# Patient Record
Sex: MALE | Race: BLACK OR AFRICAN AMERICAN | NOT HISPANIC OR LATINO | Employment: FULL TIME | URBAN - METROPOLITAN AREA
[De-identification: names, ages, dates, MRNs, and addresses within clinical notes are randomized per-mention and may not be internally consistent; named-entity substitution may affect disease eponyms.]

---

## 2018-03-01 ENCOUNTER — OFFICE VISIT (OUTPATIENT)
Dept: URGENT CARE | Facility: PHYSICIAN GROUP | Age: 58
End: 2018-03-01
Payer: COMMERCIAL

## 2018-03-01 VITALS
HEART RATE: 84 BPM | RESPIRATION RATE: 16 BRPM | OXYGEN SATURATION: 98 % | TEMPERATURE: 100 F | WEIGHT: 187 LBS | SYSTOLIC BLOOD PRESSURE: 104 MMHG | DIASTOLIC BLOOD PRESSURE: 64 MMHG | HEIGHT: 70 IN | BODY MASS INDEX: 26.77 KG/M2

## 2018-03-01 DIAGNOSIS — R68.89 FLU-LIKE SYMPTOMS: ICD-10-CM

## 2018-03-01 DIAGNOSIS — J02.9 PHARYNGITIS, UNSPECIFIED ETIOLOGY: ICD-10-CM

## 2018-03-01 LAB
INT CON NEG: NEGATIVE
INT CON POS: POSITIVE
S PYO AG THROAT QL: NORMAL

## 2018-03-01 PROCEDURE — 99203 OFFICE O/P NEW LOW 30 MIN: CPT | Performed by: PHYSICIAN ASSISTANT

## 2018-03-01 PROCEDURE — 87880 STREP A ASSAY W/OPTIC: CPT | Performed by: PHYSICIAN ASSISTANT

## 2018-03-01 ASSESSMENT — ENCOUNTER SYMPTOMS
HEADACHES: 1
SORE THROAT: 1
LOSS OF CONSCIOUSNESS: 1
SINUS PAIN: 1
DIZZINESS: 1
PSYCHIATRIC NEGATIVE: 1
GASTROINTESTINAL NEGATIVE: 1
CARDIOVASCULAR NEGATIVE: 1
COUGH: 1
EYES NEGATIVE: 1
MYALGIAS: 1
FEVER: 1

## 2018-03-01 NOTE — PATIENT INSTRUCTIONS
Flonase and nasal saline irrigation (netti pot or Tim Med Sinus Rinse).  Used distilled water or boiled tap water with nasal flushes, not straight tap water.  Humidifier at bedtime.  Hot steam showers to loosen up mucous.  Cough medicine at bedtime (nyquil).  Delsym during the day.  Lots of fluids, tea with honey.  Ibuprofen for headache, fever, chills.  Be sure to take with food.  Salt water gargles.  Return if worsening: Yellow thicker mucus changes, worsening pain around the eyes and radiates to the teeth, and fever over 101°F.

## 2018-03-01 NOTE — PROGRESS NOTES
Subjective:      Víctor Peacock is a 57 y.o. male who presents with Pharyngitis (x2-3 days,loss of voice, passed out this AM and yesterday)            HPI  Chief Complaint   Patient presents with   • Pharyngitis     x2-3 days,loss of voice, passed out this AM and yesterday       HPI:  Víctor Peacock is a 57 y.o. maled who presents with sore throat and extreme fatigue for 2-3 days. Started actually on Monday with body aches, sore throat, loss of voice, and dizziness. Feeling quite fatigued. States he passed out last night and again this morning while sitting on the stairs. He denies any prodrome or history of seizures. Just suddenly faded out. Does remember feeling hot and cold prior to incident. Has not been drinking enough water or eating. Lack of appetite. Wife and room with similar symptoms that started for 5 days prior. He has not tried any cough expectorants. Minimal cough. Has Chloraseptic with little improvement. Symptoms started on Tuesday.  Patient denies, chest pain, palpitations,or n/v/d.      History reviewed. No pertinent past medical history.    History reviewed. No pertinent surgical history.    History reviewed. No pertinent family history.  No pertinent family history.    Social History     Social History   • Marital status:      Spouse name: N/A   • Number of children: N/A   • Years of education: N/A     Occupational History   • Not on file.     Social History Main Topics   • Smoking status: Never Smoker   • Smokeless tobacco: Never Used   • Alcohol use No   • Drug use: No   • Sexual activity: Yes     Partners: Female      Comment:      Other Topics Concern   • Not on file     Social History Narrative   • No narrative on file       No current outpatient prescriptions on file.    No Known Allergies     Review of Systems   Constitutional: Positive for fever and malaise/fatigue.   HENT: Positive for sinus pain and sore throat.    Eyes: Negative.    Respiratory: Positive for cough.   "  Cardiovascular: Negative.    Gastrointestinal: Negative.    Genitourinary: Negative.    Musculoskeletal: Positive for myalgias.   Skin: Negative.    Neurological: Positive for dizziness, loss of consciousness and headaches.   Endo/Heme/Allergies: Negative.    Psychiatric/Behavioral: Negative.           Objective:     /64   Pulse 84   Temp 37.8 °C (100 °F)   Resp 16   Ht 1.778 m (5' 10\")   Wt 84.8 kg (187 lb)   SpO2 98%   BMI 26.83 kg/m²      Physical Exam       Nursing note and vital signs reviewed.    Constitutional:  Appropriately groomed, pleasant affect, well nourished, and in no acute distress.    HEENT:  Head: Atruamatic, normocephalic.    Ears:  EAC with mild cerumen bilaterally, not erythematous.  TM’s pearly gray with cone of light present and umbo and malleolus visible bilaterally.  No bulging or fluid bubbles present in middle ear.    Eyes:  PERRLA, EOM's full, sclera white, conjunctiva not erythematous, and medial canthus without exudate bilaterally.    Nose:  Nares patent bilaterally.  Nasal mucosa edematous with clear rhinorrhea bilaterally.  Frontal and maxillary sinuses not tender to percussion.    Throat:  Oropharynx erythematous, with no enlargement of the palatine tonsils bilaterally with no exudates.    Posterior oropharynx with cobblestoning and clear to white post nasal drainage present.  Soft palate rises symmetrically bilaterally and uvula midline.  Neck supple, with mild proximal anterior cervical chain lymphadenopathy that is soft and mobile to palpation.      Lungs: Respiratory effort within normal limits. Lungs clear to auscultation bilaterally. No crackles or rhonchi noted.     Heart:  Regular rate and rhythm without rubs, murmurs, or gallops. Dorsalis pedis and radial pulses 2+ bilaterally. No lower extremity edema.    Muscle skeletal:  Gait and station wnl, non antalgic.    Derm:  No lesions or rashes. Overall good turgor pressure.     Psychiatric:  Normal judgement, " mood and affect.       Assessment/Plan:     1. Flu-like symptoms  POCT Rapid Strep A   2. Pharyngitis, unspecified etiology  POCT Rapid Strep A      Patient presents with flulike symptoms outside of testing or treatment window. Worsening sore throat. On exam patient has erythematous oropharynx but not significantly enlarged tonsils bilaterally. Rapid strep negative. Lungs clear to auscultation. Patient with temperature of 100° with a pulse ox of 98% room air. Suspect influenza in a vice pushing fluids and continue ibuprofen and Tylenol. Patient's episodes of loss of consciousness likely due to poor by mouth intake, fluids and rapid fever and chills. Did advise ER if symptoms worsen. Advised patient to take Phenergan with deslym for bedtime that I prescribed his wife and monitor for changes. Advised humidifier and nasal saline irrigation.    Patient was in agreement with this treatment plan and seemed to understand without barriers. All questions were encouraged and answered.  Reviewed signs and symptoms of when to seek emergency medical care.     Please note that this dictation was created using voice recognition software.  I have made every reasonable attempt to correct obvious errors, but I expect there are errors of eloise and possibly content that I did not discover before finalizing the note.

## 2021-03-23 ENCOUNTER — IMMUNIZATION (OUTPATIENT)
Dept: FAMILY PLANNING/WOMEN'S HEALTH CLINIC | Facility: IMMUNIZATION CENTER | Age: 61
End: 2021-03-23
Payer: COMMERCIAL

## 2021-03-23 DIAGNOSIS — Z23 ENCOUNTER FOR VACCINATION: Primary | ICD-10-CM

## 2021-03-23 PROCEDURE — 0001A PFIZER SARS-COV-2 VACCINE: CPT

## 2021-03-23 PROCEDURE — 91300 PFIZER SARS-COV-2 VACCINE: CPT

## 2021-04-16 ENCOUNTER — IMMUNIZATION (OUTPATIENT)
Dept: FAMILY PLANNING/WOMEN'S HEALTH CLINIC | Facility: IMMUNIZATION CENTER | Age: 61
End: 2021-04-16
Attending: INTERNAL MEDICINE
Payer: COMMERCIAL

## 2021-04-16 DIAGNOSIS — Z23 ENCOUNTER FOR VACCINATION: Primary | ICD-10-CM

## 2021-04-16 PROCEDURE — 0002A PFIZER SARS-COV-2 VACCINE: CPT

## 2021-04-16 PROCEDURE — 91300 PFIZER SARS-COV-2 VACCINE: CPT

## 2022-07-13 ENCOUNTER — TELEPHONE (OUTPATIENT)
Dept: SCHEDULING | Facility: IMAGING CENTER | Age: 62
End: 2022-07-13

## 2023-07-11 SDOH — ECONOMIC STABILITY: HOUSING INSECURITY
IN THE LAST 12 MONTHS, WAS THERE A TIME WHEN YOU DID NOT HAVE A STEADY PLACE TO SLEEP OR SLEPT IN A SHELTER (INCLUDING NOW)?: NO

## 2023-07-11 SDOH — ECONOMIC STABILITY: TRANSPORTATION INSECURITY
IN THE PAST 12 MONTHS, HAS LACK OF TRANSPORTATION KEPT YOU FROM MEETINGS, WORK, OR FROM GETTING THINGS NEEDED FOR DAILY LIVING?: PATIENT DECLINED

## 2023-07-11 SDOH — ECONOMIC STABILITY: TRANSPORTATION INSECURITY
IN THE PAST 12 MONTHS, HAS THE LACK OF TRANSPORTATION KEPT YOU FROM MEDICAL APPOINTMENTS OR FROM GETTING MEDICATIONS?: PATIENT DECLINED

## 2023-07-11 SDOH — ECONOMIC STABILITY: FOOD INSECURITY: WITHIN THE PAST 12 MONTHS, YOU WORRIED THAT YOUR FOOD WOULD RUN OUT BEFORE YOU GOT MONEY TO BUY MORE.: NEVER TRUE

## 2023-07-11 SDOH — HEALTH STABILITY: PHYSICAL HEALTH: ON AVERAGE, HOW MANY DAYS PER WEEK DO YOU ENGAGE IN MODERATE TO STRENUOUS EXERCISE (LIKE A BRISK WALK)?: 3 DAYS

## 2023-07-11 SDOH — ECONOMIC STABILITY: HOUSING INSECURITY: IN THE LAST 12 MONTHS, HOW MANY PLACES HAVE YOU LIVED?: 1

## 2023-07-11 SDOH — ECONOMIC STABILITY: TRANSPORTATION INSECURITY
IN THE PAST 12 MONTHS, HAS LACK OF RELIABLE TRANSPORTATION KEPT YOU FROM MEDICAL APPOINTMENTS, MEETINGS, WORK OR FROM GETTING THINGS NEEDED FOR DAILY LIVING?: PATIENT DECLINED

## 2023-07-11 SDOH — ECONOMIC STABILITY: FOOD INSECURITY: WITHIN THE PAST 12 MONTHS, THE FOOD YOU BOUGHT JUST DIDN'T LAST AND YOU DIDN'T HAVE MONEY TO GET MORE.: NEVER TRUE

## 2023-07-11 SDOH — ECONOMIC STABILITY: INCOME INSECURITY: IN THE LAST 12 MONTHS, WAS THERE A TIME WHEN YOU WERE NOT ABLE TO PAY THE MORTGAGE OR RENT ON TIME?: NO

## 2023-07-11 SDOH — HEALTH STABILITY: PHYSICAL HEALTH: ON AVERAGE, HOW MANY MINUTES DO YOU ENGAGE IN EXERCISE AT THIS LEVEL?: 40 MIN

## 2023-07-11 SDOH — HEALTH STABILITY: MENTAL HEALTH
STRESS IS WHEN SOMEONE FEELS TENSE, NERVOUS, ANXIOUS, OR CAN'T SLEEP AT NIGHT BECAUSE THEIR MIND IS TROUBLED. HOW STRESSED ARE YOU?: NOT AT ALL

## 2023-07-11 ASSESSMENT — SOCIAL DETERMINANTS OF HEALTH (SDOH)
HOW MANY DRINKS CONTAINING ALCOHOL DO YOU HAVE ON A TYPICAL DAY WHEN YOU ARE DRINKING: PATIENT DECLINED
HOW OFTEN DO YOU GET TOGETHER WITH FRIENDS OR RELATIVES?: ONCE A WEEK
HOW OFTEN DO YOU HAVE SIX OR MORE DRINKS ON ONE OCCASION: NEVER
HOW OFTEN DO YOU HAVE A DRINK CONTAINING ALCOHOL: MONTHLY OR LESS
HOW OFTEN DO YOU ATTENT MEETINGS OF THE CLUB OR ORGANIZATION YOU BELONG TO?: 1 TO 4 TIMES PER YEAR
IN A TYPICAL WEEK, HOW MANY TIMES DO YOU TALK ON THE PHONE WITH FAMILY, FRIENDS, OR NEIGHBORS?: ONCE A WEEK
DO YOU BELONG TO ANY CLUBS OR ORGANIZATIONS SUCH AS CHURCH GROUPS UNIONS, FRATERNAL OR ATHLETIC GROUPS, OR SCHOOL GROUPS?: YES
WITHIN THE PAST 12 MONTHS, YOU WORRIED THAT YOUR FOOD WOULD RUN OUT BEFORE YOU GOT THE MONEY TO BUY MORE: NEVER TRUE
HOW OFTEN DO YOU ATTENT MEETINGS OF THE CLUB OR ORGANIZATION YOU BELONG TO?: 1 TO 4 TIMES PER YEAR
HOW OFTEN DO YOU ATTEND CHURCH OR RELIGIOUS SERVICES?: PATIENT DECLINED
HOW OFTEN DO YOU ATTEND CHURCH OR RELIGIOUS SERVICES?: PATIENT DECLINED
DO YOU BELONG TO ANY CLUBS OR ORGANIZATIONS SUCH AS CHURCH GROUPS UNIONS, FRATERNAL OR ATHLETIC GROUPS, OR SCHOOL GROUPS?: YES
HOW OFTEN DO YOU GET TOGETHER WITH FRIENDS OR RELATIVES?: ONCE A WEEK
IN A TYPICAL WEEK, HOW MANY TIMES DO YOU TALK ON THE PHONE WITH FAMILY, FRIENDS, OR NEIGHBORS?: ONCE A WEEK

## 2023-07-11 ASSESSMENT — LIFESTYLE VARIABLES
HOW MANY STANDARD DRINKS CONTAINING ALCOHOL DO YOU HAVE ON A TYPICAL DAY: PATIENT DECLINED
HOW OFTEN DO YOU HAVE A DRINK CONTAINING ALCOHOL: MONTHLY OR LESS
AUDIT-C TOTAL SCORE: -1
SKIP TO QUESTIONS 9-10: 0
HOW OFTEN DO YOU HAVE SIX OR MORE DRINKS ON ONE OCCASION: NEVER

## 2023-07-12 ENCOUNTER — OFFICE VISIT (OUTPATIENT)
Dept: MEDICAL GROUP | Facility: PHYSICIAN GROUP | Age: 63
End: 2023-07-12
Payer: COMMERCIAL

## 2023-07-12 VITALS
TEMPERATURE: 97.8 F | BODY MASS INDEX: 24.42 KG/M2 | OXYGEN SATURATION: 99 % | HEIGHT: 70 IN | RESPIRATION RATE: 18 BRPM | SYSTOLIC BLOOD PRESSURE: 126 MMHG | DIASTOLIC BLOOD PRESSURE: 74 MMHG | WEIGHT: 170.6 LBS | HEART RATE: 66 BPM

## 2023-07-12 DIAGNOSIS — E55.9 VITAMIN D DEFICIENCY: ICD-10-CM

## 2023-07-12 DIAGNOSIS — Z00.00 WELLNESS EXAMINATION: ICD-10-CM

## 2023-07-12 DIAGNOSIS — Z12.5 SCREENING FOR PROSTATE CANCER: ICD-10-CM

## 2023-07-12 DIAGNOSIS — Z23 NEED FOR VACCINATION: ICD-10-CM

## 2023-07-12 PROCEDURE — 90715 TDAP VACCINE 7 YRS/> IM: CPT | Performed by: FAMILY MEDICINE

## 2023-07-12 PROCEDURE — 99386 PREV VISIT NEW AGE 40-64: CPT | Mod: 25 | Performed by: FAMILY MEDICINE

## 2023-07-12 PROCEDURE — 90471 IMMUNIZATION ADMIN: CPT | Performed by: FAMILY MEDICINE

## 2023-07-12 PROCEDURE — 3078F DIAST BP <80 MM HG: CPT | Performed by: FAMILY MEDICINE

## 2023-07-12 PROCEDURE — 3074F SYST BP LT 130 MM HG: CPT | Performed by: FAMILY MEDICINE

## 2023-07-12 ASSESSMENT — PATIENT HEALTH QUESTIONNAIRE - PHQ9: CLINICAL INTERPRETATION OF PHQ2 SCORE: 0

## 2023-07-12 NOTE — PROGRESS NOTES
"Subjective:     CC:    Chief Complaint   Patient presents with    Establish Care       HISTORY OF THE PRESENT ILLNESS: Patient is a 62 y.o. male. This pleasant patient is here today to establish care.  Patient has no complaints he feels like he is probably due for his next Cologuard.  Patient is also willing to get some lab work done for wellness.    No problem-specific Assessment & Plan notes found for this encounter.      Allergies: Patient has no known allergies.    No current Epic-ordered outpatient medications on file.     No current Epic-ordered facility-administered medications on file.       No past medical history on file.    No past surgical history on file.    Social History     Tobacco Use    Smoking status: Never    Smokeless tobacco: Never   Substance Use Topics    Alcohol use: No    Drug use: No       Social History     Social History Narrative    Not on file       Family History   Problem Relation Age of Onset    Kidney Disease Father        Health Maintenance: Completed    ROS:   Gen: no fevers/chills, no changes in weight  Eyes: no changes in vision  ENT: no sore throat, no hearing loss, no bloody nose  Pulm: no sob, no cough, no loud snoring or stop breathing   GI: no nausea/vomiting, no diarrhea  : no dysuria, no nocturia or weak stream  Neuro: no headaches, no numbness/tingling  Heme/Lymph: no easy bruising      Objective:     Exam: /74 (BP Location: Right arm, Patient Position: Sitting, BP Cuff Size: Adult)   Pulse 66   Temp 36.6 °C (97.8 °F) (Temporal)   Resp 18   Ht 1.778 m (5' 10\")   Wt 77.4 kg (170 lb 9.6 oz)   SpO2 99%  Body mass index is 24.48 kg/m².    Gen: Alert and oriented, No apparent distress.  Skin: Warm and dry.  No obvious lesions.  Eyes: Sclera wnl Pupils normal in size  ENT: Canals wnl and TM are not red, mouth negative redness or exudates  Neck: Neck is supple without lymphadenopathy.  Lungs: Normal effort, CTA bilaterally, no wheezes, rhonchi, or " rales  CV: Regular rate and rhythm. No murmurs, rubs, or gallops.  ABD: Soft non-tender no organomegaly  Musculoskeletal: Normal gait. No extremity cyanosis, clubbing, or edema.  Neuro: Oriented to person, place and time  Psych: Mood is wnl     Labs: Patient given a listing of all the [facility] labs recommend he be fasting    Assessment & Plan:   62 y.o. male with the following -    1. Wellness examination  I did order labs and also order the Cologuard.  On anticipatory guidance recommend patient always wear his seatbelt while driving also wear helmet if he is cycling or using a motorcycle.  Patient also needs ear protection for loud noises if he is working out in the yard.  I did discuss with patient the shingles vaccine that he can get at any of the pharmacies  - COLOGUARD (FIT DNA)  - CBC WITH DIFFERENTIAL; Future  - Comp Metabolic Panel; Future  - Lipid Profile; Future    2. Need for vaccination  - Tdap Vaccine =>8YO IM    3. Screening for prostate cancer  I did order this  - PROSTATE SPECIFIC AG SCREENING; Future    4. Vitamin D deficiency  I did order this  - VITAMIN D,25 HYDROXY (DEFICIENCY); Future       Return in about 4 weeks (around 8/9/2023), or if symptoms worsen or fail to improve.    Please note that this dictation was created using voice recognition software. I have made every reasonable attempt to correct obvious errors, but I expect that there are errors of grammar and possibly content that I did not discover before finalizing the note.